# Patient Record
Sex: MALE | Race: WHITE | Employment: FULL TIME | URBAN - NONMETROPOLITAN AREA
[De-identification: names, ages, dates, MRNs, and addresses within clinical notes are randomized per-mention and may not be internally consistent; named-entity substitution may affect disease eponyms.]

---

## 2018-09-13 ENCOUNTER — APPOINTMENT (OUTPATIENT)
Dept: CT IMAGING | Age: 68
DRG: 690 | End: 2018-09-13
Payer: MEDICARE

## 2018-09-13 ENCOUNTER — APPOINTMENT (OUTPATIENT)
Dept: GENERAL RADIOLOGY | Age: 68
DRG: 690 | End: 2018-09-13
Payer: MEDICARE

## 2018-09-13 ENCOUNTER — HOSPITAL ENCOUNTER (INPATIENT)
Age: 68
LOS: 1 days | Discharge: HOME | DRG: 690 | End: 2018-09-15
Attending: FAMILY MEDICINE | Admitting: INTERNAL MEDICINE
Payer: MEDICARE

## 2018-09-13 DIAGNOSIS — R65.10 SIRS (SYSTEMIC INFLAMMATORY RESPONSE SYNDROME) (HCC): Primary | ICD-10-CM

## 2018-09-13 DIAGNOSIS — D72.829 LEUKOCYTOSIS, UNSPECIFIED TYPE: ICD-10-CM

## 2018-09-13 DIAGNOSIS — R06.00 DYSPNEA, UNSPECIFIED TYPE: ICD-10-CM

## 2018-09-13 DIAGNOSIS — N39.0 URINARY TRACT INFECTION WITHOUT HEMATURIA, SITE UNSPECIFIED: ICD-10-CM

## 2018-09-13 LAB
AMPHETAMINE+METHAMPHETAMINE URINE SCREEN: NEGATIVE
ANION GAP SERPL CALCULATED.3IONS-SCNC: 11 MEQ/L (ref 8–16)
BACTERIA: ABNORMAL /HPF
BARBITURATE QUANTITATIVE URINE: NEGATIVE
BENZODIAZEPINE QUANTITATIVE URINE: NEGATIVE
BILIRUBIN URINE: ABNORMAL
BLOOD, URINE: ABNORMAL
BUN BLDV-MCNC: 18 MG/DL (ref 7–22)
CALCIUM SERPL-MCNC: 8.8 MG/DL (ref 8.5–10.5)
CANNABINOID QUANTITATIVE URINE: POSITIVE
CASTS 2: ABNORMAL /LPF
CASTS UA: ABNORMAL /LPF
CHARACTER, URINE: ABNORMAL
CHLORIDE BLD-SCNC: 101 MEQ/L (ref 98–111)
CO2: 26 MEQ/L (ref 23–33)
COCAINE METABOLITE QUANTITATIVE URINE: NEGATIVE
COLOR: ABNORMAL
CREAT SERPL-MCNC: 1.4 MG/DL (ref 0.4–1.2)
CRYSTALS, UA: ABNORMAL
D-DIMER QUANTITATIVE: 246 NG/ML FEU (ref 0–500)
EPITHELIAL CELLS, UA: ABNORMAL /HPF
GFR SERPL CREATININE-BSD FRML MDRD: 51 ML/MIN/1.73M2
GLUCOSE BLD-MCNC: 127 MG/DL (ref 70–108)
GLUCOSE URINE: NEGATIVE MG/DL
ICTOTEST: NEGATIVE
KETONES, URINE: ABNORMAL
LEUKOCYTE ESTERASE, URINE: ABNORMAL
MISCELLANEOUS 2: ABNORMAL
NITRITE, URINE: POSITIVE
OPIATES, URINE: POSITIVE
OSMOLALITY CALCULATION: 279.2 MOSMOL/KG (ref 275–300)
OXYCODONE: NEGATIVE
PH UA: 5.5
PHENCYCLIDINE QUANTITATIVE URINE: NEGATIVE
POTASSIUM SERPL-SCNC: 3.7 MEQ/L (ref 3.5–5.2)
PRO-BNP: 279.1 PG/ML (ref 0–900)
PROCALCITONIN: 0.14 NG/ML (ref 0.01–0.09)
PROTEIN UA: 100
RBC URINE: ABNORMAL /HPF
RENAL EPITHELIAL, UA: ABNORMAL
SCAN OF BLOOD SMEAR: NORMAL
SODIUM BLD-SCNC: 138 MEQ/L (ref 135–145)
SPECIFIC GRAVITY, URINE: > 1.03 (ref 1–1.03)
TROPONIN T: < 0.01 NG/ML
UROBILINOGEN, URINE: 1 EU/DL
WBC UA: > 200 /HPF
YEAST: ABNORMAL

## 2018-09-13 PROCEDURE — 2580000003 HC RX 258: Performed by: FAMILY MEDICINE

## 2018-09-13 PROCEDURE — 87077 CULTURE AEROBIC IDENTIFY: CPT

## 2018-09-13 PROCEDURE — 84145 PROCALCITONIN (PCT): CPT

## 2018-09-13 PROCEDURE — 51798 US URINE CAPACITY MEASURE: CPT

## 2018-09-13 PROCEDURE — 80307 DRUG TEST PRSMV CHEM ANLYZR: CPT

## 2018-09-13 PROCEDURE — 87086 URINE CULTURE/COLONY COUNT: CPT

## 2018-09-13 PROCEDURE — 87040 BLOOD CULTURE FOR BACTERIA: CPT

## 2018-09-13 PROCEDURE — 6360000002 HC RX W HCPCS: Performed by: FAMILY MEDICINE

## 2018-09-13 PROCEDURE — 83880 ASSAY OF NATRIURETIC PEPTIDE: CPT

## 2018-09-13 PROCEDURE — 87184 SC STD DISK METHOD PER PLATE: CPT

## 2018-09-13 PROCEDURE — 87186 SC STD MICRODIL/AGAR DIL: CPT

## 2018-09-13 PROCEDURE — 96365 THER/PROPH/DIAG IV INF INIT: CPT

## 2018-09-13 PROCEDURE — 6360000004 HC RX CONTRAST MEDICATION: Performed by: FAMILY MEDICINE

## 2018-09-13 PROCEDURE — 99285 EMERGENCY DEPT VISIT HI MDM: CPT

## 2018-09-13 PROCEDURE — 36415 COLL VENOUS BLD VENIPUNCTURE: CPT

## 2018-09-13 PROCEDURE — 85025 COMPLETE CBC W/AUTO DIFF WBC: CPT

## 2018-09-13 PROCEDURE — 84484 ASSAY OF TROPONIN QUANT: CPT

## 2018-09-13 PROCEDURE — 71275 CT ANGIOGRAPHY CHEST: CPT

## 2018-09-13 PROCEDURE — 81001 URINALYSIS AUTO W/SCOPE: CPT

## 2018-09-13 PROCEDURE — 85379 FIBRIN DEGRADATION QUANT: CPT

## 2018-09-13 PROCEDURE — 80048 BASIC METABOLIC PNL TOTAL CA: CPT

## 2018-09-13 PROCEDURE — 93005 ELECTROCARDIOGRAM TRACING: CPT | Performed by: FAMILY MEDICINE

## 2018-09-13 PROCEDURE — 71046 X-RAY EXAM CHEST 2 VIEWS: CPT

## 2018-09-13 RX ORDER — LISINOPRIL 20 MG/1
20 TABLET ORAL DAILY
COMMUNITY

## 2018-09-13 RX ORDER — CIPROFLOXACIN 2 MG/ML
400 INJECTION, SOLUTION INTRAVENOUS ONCE
Status: COMPLETED | OUTPATIENT
Start: 2018-09-13 | End: 2018-09-14

## 2018-09-13 RX ORDER — ASPIRIN 325 MG
325 TABLET ORAL DAILY
COMMUNITY

## 2018-09-13 RX ORDER — MELOXICAM 7.5 MG/1
7.5 TABLET ORAL DAILY
Status: ON HOLD | COMMUNITY
End: 2018-09-15 | Stop reason: HOSPADM

## 2018-09-13 RX ORDER — SODIUM CHLORIDE 9 MG/ML
INJECTION, SOLUTION INTRAVENOUS CONTINUOUS
Status: DISCONTINUED | OUTPATIENT
Start: 2018-09-14 | End: 2018-09-15

## 2018-09-13 RX ORDER — FENOFIBRATE 160 MG/1
160 TABLET ORAL DAILY
COMMUNITY

## 2018-09-13 RX ORDER — ROSUVASTATIN CALCIUM 20 MG/1
20 TABLET, COATED ORAL DAILY
COMMUNITY

## 2018-09-13 RX ORDER — HYDROCHLOROTHIAZIDE 25 MG/1
25 TABLET ORAL DAILY
COMMUNITY

## 2018-09-13 RX ORDER — OMEPRAZOLE 20 MG/1
40 CAPSULE, DELAYED RELEASE ORAL DAILY
COMMUNITY

## 2018-09-13 RX ORDER — CLOPIDOGREL BISULFATE 75 MG/1
75 TABLET ORAL DAILY
COMMUNITY

## 2018-09-13 RX ADMIN — CIPROFLOXACIN 400 MG: 2 INJECTION, SOLUTION INTRAVENOUS at 23:51

## 2018-09-13 RX ADMIN — SODIUM CHLORIDE: 9 INJECTION, SOLUTION INTRAVENOUS at 23:51

## 2018-09-13 RX ADMIN — IOPAMIDOL 80 ML: 755 INJECTION, SOLUTION INTRAVENOUS at 22:36

## 2018-09-13 ASSESSMENT — ENCOUNTER SYMPTOMS
RHINORRHEA: 0
DIARRHEA: 0
COUGH: 0
EYE REDNESS: 0
SORE THROAT: 0
WHEEZING: 0
BACK PAIN: 0
EYE DISCHARGE: 0
VOMITING: 0
SHORTNESS OF BREATH: 1
ABDOMINAL PAIN: 0
NAUSEA: 0

## 2018-09-14 ENCOUNTER — APPOINTMENT (OUTPATIENT)
Dept: ULTRASOUND IMAGING | Age: 68
DRG: 690 | End: 2018-09-14
Payer: MEDICARE

## 2018-09-14 PROBLEM — R33.8 ACUTE URINARY RETENTION: Status: ACTIVE | Noted: 2018-09-14

## 2018-09-14 PROBLEM — K52.9 GASTROENTERITIS: Status: ACTIVE | Noted: 2018-09-14

## 2018-09-14 PROBLEM — R10.9 RIGHT FLANK PAIN: Status: ACTIVE | Noted: 2018-09-14

## 2018-09-14 PROBLEM — N17.9 ACUTE KIDNEY INJURY (HCC): Status: ACTIVE | Noted: 2018-09-14

## 2018-09-14 PROBLEM — R31.9 URINARY TRACT INFECTION WITH HEMATURIA: Status: ACTIVE | Noted: 2018-09-14

## 2018-09-14 PROBLEM — R91.1 NODULE OF RIGHT LUNG: Status: ACTIVE | Noted: 2018-09-14

## 2018-09-14 PROBLEM — N39.0 URINARY TRACT INFECTION WITH HEMATURIA: Status: ACTIVE | Noted: 2018-09-14

## 2018-09-14 PROBLEM — N10 ACUTE PYELONEPHRITIS: Status: ACTIVE | Noted: 2018-09-14

## 2018-09-14 PROBLEM — K52.9 GASTROENTERITIS: Status: RESOLVED | Noted: 2018-09-14 | Resolved: 2018-09-14

## 2018-09-14 PROBLEM — A41.50 SEPSIS DUE TO GRAM NEGATIVE BACTERIA (HCC): Status: ACTIVE | Noted: 2018-09-14

## 2018-09-14 LAB
ANION GAP SERPL CALCULATED.3IONS-SCNC: 13 MEQ/L (ref 8–16)
BACTERIA: ABNORMAL
BASOPHILIA: ABNORMAL
BASOPHILS # BLD: 0.3 %
BASOPHILS # BLD: 0.3 %
BASOPHILS ABSOLUTE: 0.1 THOU/MM3 (ref 0–0.1)
BASOPHILS ABSOLUTE: 0.1 THOU/MM3 (ref 0–0.1)
BILIRUBIN URINE: NEGATIVE
BLOOD, URINE: ABNORMAL
BUN BLDV-MCNC: 19 MG/DL (ref 7–22)
CALCIUM SERPL-MCNC: 8.4 MG/DL (ref 8.5–10.5)
CASTS: ABNORMAL /LPF
CASTS: ABNORMAL /LPF
CHARACTER, URINE: ABNORMAL
CHLORIDE BLD-SCNC: 105 MEQ/L (ref 98–111)
CLOSTRIDIUM DIFFICILE, PCR: NEGATIVE
CO2: 23 MEQ/L (ref 23–33)
COLOR: YELLOW
CREAT SERPL-MCNC: 1.3 MG/DL (ref 0.4–1.2)
CRYSTALS: ABNORMAL
DIFFERENTIAL TYPE: ABNORMAL
EKG ATRIAL RATE: 111 BPM
EKG ATRIAL RATE: 89 BPM
EKG P AXIS: 46 DEGREES
EKG P AXIS: 54 DEGREES
EKG P-R INTERVAL: 158 MS
EKG P-R INTERVAL: 172 MS
EKG Q-T INTERVAL: 306 MS
EKG Q-T INTERVAL: 334 MS
EKG QRS DURATION: 96 MS
EKG QRS DURATION: 98 MS
EKG QTC CALCULATION (BAZETT): 406 MS
EKG QTC CALCULATION (BAZETT): 416 MS
EKG R AXIS: -46 DEGREES
EKG R AXIS: -57 DEGREES
EKG T AXIS: 90 DEGREES
EKG T AXIS: 99 DEGREES
EKG VENTRICULAR RATE: 111 BPM
EKG VENTRICULAR RATE: 89 BPM
EOSINOPHIL # BLD: 0.2 %
EOSINOPHIL # BLD: 0.5 %
EOSINOPHILS ABSOLUTE: 0 THOU/MM3 (ref 0–0.4)
EOSINOPHILS ABSOLUTE: 0.1 THOU/MM3 (ref 0–0.4)
EPITHELIAL CELLS, UA: ABNORMAL /HPF
ERYTHROCYTE [DISTWIDTH] IN BLOOD BY AUTOMATED COUNT: 17 % (ref 11.5–14.5)
ERYTHROCYTE [DISTWIDTH] IN BLOOD BY AUTOMATED COUNT: 17 % (ref 11.5–14.5)
ERYTHROCYTE [DISTWIDTH] IN BLOOD BY AUTOMATED COUNT: 49.1 FL (ref 35–45)
ERYTHROCYTE [DISTWIDTH] IN BLOOD BY AUTOMATED COUNT: 49.8 FL (ref 35–45)
GFR SERPL CREATININE-BSD FRML MDRD: 55 ML/MIN/1.73M2
GLUCOSE BLD-MCNC: 107 MG/DL (ref 70–108)
GLUCOSE, URINE: NEGATIVE MG/DL
HCT VFR BLD CALC: 45.8 % (ref 42–52)
HCT VFR BLD CALC: 46.5 % (ref 42–52)
HEMOGLOBIN: 13.8 GM/DL (ref 14–18)
HEMOGLOBIN: 14.3 GM/DL (ref 14–18)
IMMATURE GRANS (ABS): 0.1 THOU/MM3 (ref 0–0.07)
IMMATURE GRANS (ABS): 0.11 THOU/MM3 (ref 0–0.07)
IMMATURE GRANULOCYTES: 0.5 %
IMMATURE GRANULOCYTES: 0.6 %
KETONES, URINE: NEGATIVE
LACTIC ACID: 1.2 MMOL/L (ref 0.5–2.2)
LEUKOCYTE EST, POC: ABNORMAL
LYMPHOCYTES # BLD: 12.8 %
LYMPHOCYTES # BLD: 17.8 %
LYMPHOCYTES ABSOLUTE: 2.4 THOU/MM3 (ref 1–4.8)
LYMPHOCYTES ABSOLUTE: 3.5 THOU/MM3 (ref 1–4.8)
MAGNESIUM: 2.1 MG/DL (ref 1.6–2.4)
MCH RBC QN AUTO: 25.3 PG (ref 26–33)
MCH RBC QN AUTO: 25.5 PG (ref 26–33)
MCHC RBC AUTO-ENTMCNC: 30.1 GM/DL (ref 32.2–35.5)
MCHC RBC AUTO-ENTMCNC: 30.8 GM/DL (ref 32.2–35.5)
MCV RBC AUTO: 83 FL (ref 80–94)
MCV RBC AUTO: 84 FL (ref 80–94)
MISCELLANEOUS LAB TEST RESULT: ABNORMAL
MONOCYTES # BLD: 13 %
MONOCYTES # BLD: 14.8 %
MONOCYTES ABSOLUTE: 2.5 THOU/MM3 (ref 0.4–1.3)
MONOCYTES ABSOLUTE: 2.7 THOU/MM3 (ref 0.4–1.3)
NITRITE, URINE: NEGATIVE
NUCLEATED RED BLOOD CELLS: 0 /100 WBC
NUCLEATED RED BLOOD CELLS: 0 /100 WBC
PATHOLOGIST REVIEW: ABNORMAL
PH UA: 5.5
PHOSPHORUS: 2.3 MG/DL (ref 2.4–4.7)
PLATELET # BLD: 201 THOU/MM3 (ref 130–400)
PLATELET # BLD: 234 THOU/MM3 (ref 130–400)
PLATELET ESTIMATE: ADEQUATE
PLATELET ESTIMATE: ADEQUATE
PMV BLD AUTO: 10.6 FL (ref 9.4–12.4)
PMV BLD AUTO: 11.8 FL (ref 9.4–12.4)
POTASSIUM SERPL-SCNC: 3.9 MEQ/L (ref 3.5–5.2)
PROTEIN UA: ABNORMAL MG/DL
RBC # BLD: 5.45 MILL/MM3 (ref 4.7–6.1)
RBC # BLD: 5.6 MILL/MM3 (ref 4.7–6.1)
RBC URINE: ABNORMAL /HPF
RENAL EPITHELIAL, UA: ABNORMAL
SCAN OF BLOOD SMEAR: NORMAL
SEG NEUTROPHILS: 67.8 %
SEG NEUTROPHILS: 71.4 %
SEGMENTED NEUTROPHILS ABSOLUTE COUNT: 13.2 THOU/MM3 (ref 1.8–7.7)
SEGMENTED NEUTROPHILS ABSOLUTE COUNT: 13.2 THOU/MM3 (ref 1.8–7.7)
SODIUM BLD-SCNC: 141 MEQ/L (ref 135–145)
SPECIFIC GRAVITY UA: > 1.03 (ref 1–1.03)
TOTAL CK: 361 U/L (ref 55–170)
UROBILINOGEN, URINE: 0.2 EU/DL
WBC # BLD: 18.5 THOU/MM3 (ref 4.8–10.8)
WBC # BLD: 19.4 THOU/MM3 (ref 4.8–10.8)
WBC UA: ABNORMAL /HPF
YEAST: ABNORMAL

## 2018-09-14 PROCEDURE — 94640 AIRWAY INHALATION TREATMENT: CPT

## 2018-09-14 PROCEDURE — 87081 CULTURE SCREEN ONLY: CPT

## 2018-09-14 PROCEDURE — 93005 ELECTROCARDIOGRAM TRACING: CPT | Performed by: INTERNAL MEDICINE

## 2018-09-14 PROCEDURE — 96366 THER/PROPH/DIAG IV INF ADDON: CPT

## 2018-09-14 PROCEDURE — G0378 HOSPITAL OBSERVATION PER HR: HCPCS

## 2018-09-14 PROCEDURE — 85025 COMPLETE CBC W/AUTO DIFF WBC: CPT

## 2018-09-14 PROCEDURE — 81001 URINALYSIS AUTO W/SCOPE: CPT

## 2018-09-14 PROCEDURE — 2580000003 HC RX 258: Performed by: INTERNAL MEDICINE

## 2018-09-14 PROCEDURE — 93010 ELECTROCARDIOGRAM REPORT: CPT | Performed by: INTERNAL MEDICINE

## 2018-09-14 PROCEDURE — 6370000000 HC RX 637 (ALT 250 FOR IP): Performed by: INTERNAL MEDICINE

## 2018-09-14 PROCEDURE — 87493 C DIFF AMPLIFIED PROBE: CPT

## 2018-09-14 PROCEDURE — 36415 COLL VENOUS BLD VENIPUNCTURE: CPT

## 2018-09-14 PROCEDURE — 2580000003 HC RX 258: Performed by: FAMILY MEDICINE

## 2018-09-14 PROCEDURE — 1200000003 HC TELEMETRY R&B

## 2018-09-14 PROCEDURE — 99223 1ST HOSP IP/OBS HIGH 75: CPT | Performed by: INTERNAL MEDICINE

## 2018-09-14 PROCEDURE — 80048 BASIC METABOLIC PNL TOTAL CA: CPT

## 2018-09-14 PROCEDURE — 6360000002 HC RX W HCPCS: Performed by: INTERNAL MEDICINE

## 2018-09-14 PROCEDURE — 83605 ASSAY OF LACTIC ACID: CPT

## 2018-09-14 PROCEDURE — 76770 US EXAM ABDO BACK WALL COMP: CPT

## 2018-09-14 PROCEDURE — 51798 US URINE CAPACITY MEASURE: CPT

## 2018-09-14 PROCEDURE — 84100 ASSAY OF PHOSPHORUS: CPT

## 2018-09-14 PROCEDURE — 82550 ASSAY OF CK (CPK): CPT

## 2018-09-14 PROCEDURE — 83735 ASSAY OF MAGNESIUM: CPT

## 2018-09-14 RX ORDER — TESTOSTERONE CYPIONATE 200 MG/ML
200 INJECTION INTRAMUSCULAR
COMMUNITY

## 2018-09-14 RX ORDER — PANTOPRAZOLE SODIUM 40 MG/1
40 TABLET, DELAYED RELEASE ORAL
Status: DISCONTINUED | OUTPATIENT
Start: 2018-09-14 | End: 2018-09-15 | Stop reason: HOSPADM

## 2018-09-14 RX ORDER — ASPIRIN 325 MG
325 TABLET ORAL DAILY
Status: DISCONTINUED | OUTPATIENT
Start: 2018-09-14 | End: 2018-09-15 | Stop reason: HOSPADM

## 2018-09-14 RX ORDER — FENOFIBRATE 160 MG/1
160 TABLET ORAL DAILY
Status: DISCONTINUED | OUTPATIENT
Start: 2018-09-14 | End: 2018-09-15 | Stop reason: HOSPADM

## 2018-09-14 RX ORDER — POTASSIUM CHLORIDE 20 MEQ/1
40 TABLET, EXTENDED RELEASE ORAL PRN
Status: DISCONTINUED | OUTPATIENT
Start: 2018-09-14 | End: 2018-09-15 | Stop reason: HOSPADM

## 2018-09-14 RX ORDER — IPRATROPIUM BROMIDE AND ALBUTEROL SULFATE 2.5; .5 MG/3ML; MG/3ML
1 SOLUTION RESPIRATORY (INHALATION) EVERY 4 HOURS PRN
Status: DISCONTINUED | OUTPATIENT
Start: 2018-09-14 | End: 2018-09-15 | Stop reason: HOSPADM

## 2018-09-14 RX ORDER — HYDRALAZINE HYDROCHLORIDE 25 MG/1
25 TABLET, FILM COATED ORAL EVERY 8 HOURS SCHEDULED
Status: DISCONTINUED | OUTPATIENT
Start: 2018-09-14 | End: 2018-09-15

## 2018-09-14 RX ORDER — CIPROFLOXACIN 2 MG/ML
400 INJECTION, SOLUTION INTRAVENOUS EVERY 12 HOURS
Status: DISCONTINUED | OUTPATIENT
Start: 2018-09-14 | End: 2018-09-15

## 2018-09-14 RX ORDER — TRAMADOL HYDROCHLORIDE 50 MG/1
50 TABLET ORAL EVERY 6 HOURS PRN
Status: DISCONTINUED | OUTPATIENT
Start: 2018-09-14 | End: 2018-09-15 | Stop reason: HOSPADM

## 2018-09-14 RX ORDER — POTASSIUM CHLORIDE 20 MEQ/1
40 TABLET, EXTENDED RELEASE ORAL PRN
Status: DISCONTINUED | OUTPATIENT
Start: 2018-09-14 | End: 2018-09-14 | Stop reason: SDUPTHER

## 2018-09-14 RX ORDER — ONDANSETRON 2 MG/ML
4 INJECTION INTRAMUSCULAR; INTRAVENOUS EVERY 6 HOURS PRN
Status: DISCONTINUED | OUTPATIENT
Start: 2018-09-14 | End: 2018-09-15 | Stop reason: HOSPADM

## 2018-09-14 RX ORDER — POTASSIUM CHLORIDE 7.45 MG/ML
10 INJECTION INTRAVENOUS PRN
Status: DISCONTINUED | OUTPATIENT
Start: 2018-09-14 | End: 2018-09-15 | Stop reason: HOSPADM

## 2018-09-14 RX ORDER — POTASSIUM CHLORIDE 20MEQ/15ML
40 LIQUID (ML) ORAL PRN
Status: DISCONTINUED | OUTPATIENT
Start: 2018-09-14 | End: 2018-09-14 | Stop reason: SDUPTHER

## 2018-09-14 RX ORDER — ATORVASTATIN CALCIUM 20 MG/1
20 TABLET, FILM COATED ORAL NIGHTLY
Status: DISCONTINUED | OUTPATIENT
Start: 2018-09-14 | End: 2018-09-15 | Stop reason: HOSPADM

## 2018-09-14 RX ORDER — CLOPIDOGREL BISULFATE 75 MG/1
75 TABLET ORAL DAILY
Status: DISCONTINUED | OUTPATIENT
Start: 2018-09-14 | End: 2018-09-15 | Stop reason: HOSPADM

## 2018-09-14 RX ORDER — TAMSULOSIN HYDROCHLORIDE 0.4 MG/1
0.4 CAPSULE ORAL DAILY
Status: DISCONTINUED | OUTPATIENT
Start: 2018-09-14 | End: 2018-09-15 | Stop reason: HOSPADM

## 2018-09-14 RX ORDER — ACETAMINOPHEN 325 MG/1
650 TABLET ORAL EVERY 4 HOURS PRN
Status: DISCONTINUED | OUTPATIENT
Start: 2018-09-14 | End: 2018-09-15 | Stop reason: HOSPADM

## 2018-09-14 RX ORDER — POTASSIUM CHLORIDE 20MEQ/15ML
40 LIQUID (ML) ORAL PRN
Status: DISCONTINUED | OUTPATIENT
Start: 2018-09-14 | End: 2018-09-15 | Stop reason: HOSPADM

## 2018-09-14 RX ORDER — POTASSIUM CHLORIDE 7.45 MG/ML
10 INJECTION INTRAVENOUS PRN
Status: DISCONTINUED | OUTPATIENT
Start: 2018-09-14 | End: 2018-09-14 | Stop reason: SDUPTHER

## 2018-09-14 RX ADMIN — METOPROLOL TARTRATE 25 MG: 25 TABLET ORAL at 21:02

## 2018-09-14 RX ADMIN — ATORVASTATIN CALCIUM 20 MG: 20 TABLET, FILM COATED ORAL at 21:02

## 2018-09-14 RX ADMIN — METOPROLOL TARTRATE 25 MG: 25 TABLET ORAL at 09:26

## 2018-09-14 RX ADMIN — Medication 2 PUFF: at 21:13

## 2018-09-14 RX ADMIN — HYDRALAZINE HYDROCHLORIDE 25 MG: 25 TABLET ORAL at 23:00

## 2018-09-14 RX ADMIN — PANTOPRAZOLE SODIUM 40 MG: 40 TABLET, DELAYED RELEASE ORAL at 05:40

## 2018-09-14 RX ADMIN — SODIUM CHLORIDE: 9 INJECTION, SOLUTION INTRAVENOUS at 13:01

## 2018-09-14 RX ADMIN — ASPIRIN 325 MG: 325 TABLET, COATED ORAL at 16:02

## 2018-09-14 RX ADMIN — FENOFIBRATE 160 MG: 160 TABLET ORAL at 09:26

## 2018-09-14 RX ADMIN — CLOPIDOGREL BISULFATE 75 MG: 75 TABLET ORAL at 16:02

## 2018-09-14 RX ADMIN — CIPROFLOXACIN 400 MG: 2 INJECTION, SOLUTION INTRAVENOUS at 23:30

## 2018-09-14 RX ADMIN — HYDRALAZINE HYDROCHLORIDE 25 MG: 25 TABLET ORAL at 05:37

## 2018-09-14 RX ADMIN — SODIUM CHLORIDE: 9 INJECTION, SOLUTION INTRAVENOUS at 05:44

## 2018-09-14 RX ADMIN — RIVAROXABAN 20 MG: 20 TABLET, FILM COATED ORAL at 16:02

## 2018-09-14 RX ADMIN — HYDRALAZINE HYDROCHLORIDE 25 MG: 25 TABLET ORAL at 15:03

## 2018-09-14 RX ADMIN — CIPROFLOXACIN 400 MG: 2 INJECTION, SOLUTION INTRAVENOUS at 13:01

## 2018-09-14 ASSESSMENT — PAIN SCALES - GENERAL
PAINLEVEL_OUTOF10: 0

## 2018-09-14 NOTE — PLAN OF CARE
Problem: Discharge Planning:  Goal: Discharged to appropriate level of care  Discharged to appropriate level of care   Outcome: Ongoing  Home with family     Problem: Gas Exchange - Impaired:  Goal: Levels of oxygenation will improve  Levels of oxygenation will improve   Outcome: Ongoing  On room air, lungs clear and diminished    Problem: Infection, Septic Shock:  Goal: Will show no infection signs and symptoms  Will show no infection signs and symptoms   Outcome: Ongoing  WBC 19, on Cipro, afebrile     Problem: Tissue Perfusion, Altered:  Goal: Circulatory function within specified parameters  Circulatory function within specified parameters   Outcome: Ongoing  Monitoring VS, IVF for low urine output    Problem: Venous Thromboembolism:  Goal: Will show no signs or symptoms of venous thromboembolism  Will show no signs or symptoms of venous thromboembolism   Outcome: Ongoing  On xarelto for hx Afib    Problem: Skin Integrity:  Goal: Skin integrity will stabilize  Skin integrity will stabilize   Outcome: Ongoing  Blister to right shin. No new signs or symptoms of skin breakdown noted this shift, encouraging patient to turn and reposition self in bed q2h      Problem: GI  Goal: No bowel complications  Outcome: Ongoing  Had one loose BM, Cdiff negative     Problem: Musculor/Skeletal Functional Status  Goal: Absence of falls  Outcome: Ongoing  No falls noted this shift. Continue falling star program. Bed alarm on, bed in low position. Call light and personal belongings in reach. Patient uses call light appropriately. Comments: Care plan reviewed with patient. Patient verbalize understanding of the plan of care and contribute to goal setting.

## 2018-09-14 NOTE — PROGRESS NOTES
Urology consult called to Dr Jan Guevara for urinary retention. Order for bladder scan then to call with results.

## 2018-09-14 NOTE — ED NOTES
Pt resting comfortably on cot. Respires easy and unlabored. Updated on admission status. Will monitor.       Anisha Hamlin RN  09/14/18 6045

## 2018-09-14 NOTE — ED PROVIDER NOTES
CT, Ultrasound and MRI are read by the radiologist.    CTA [address]   Final Result   1. Negative exam for obvious pulmonary embolus changes. 2. Cardiac enlargement with minimal venous stasis and/or mild congestion see above discussion. 3. Right middle lobe pulmonary nodule, six-month one-year follow-up can be considered for further assessment. **This report has been created using voice recognition software. It may contain minor errors which are inherent in voice recognition technology. **      Final report electronically signed by Dr. Lizzy Diaz on 9/13/2018 10:57 PM      XR CHEST STANDARD (2 VW)   Final Result   1. Postoperative sternotomy changes and cardiac enlargement. 2. No active pathology present. **This report has been created using voice recognition software. It may contain minor errors which are inherent in voice recognition technology. **      Final report electronically signed by Dr. Lizzy Diaz on 9/13/2018 9:12 PM      US RENAL COMPLETE    (Results Pending)       LABS:   Labs Reviewed   CBC WITH AUTO DIFFERENTIAL - Abnormal; Notable for the following:        Result Value    WBC 18.5 (*)     MCH 25.5 (*)     MCHC 30.8 (*)     RDW-CV 17.0 (*)     RDW-SD 49.1 (*)     All other components within normal limits   BASIC METABOLIC PANEL - Abnormal; Notable for the following:     Glucose 127 (*)     CREATININE 1.4 (*)     All other components within normal limits   URINE WITH REFLEXED MICRO - Abnormal; Notable for the following:     Bilirubin Urine SMALL (*)     Ketones, Urine TRACE (*)     Specific Gravity, Urine > 1.030 (*)     Blood, Urine LARGE (*)     Protein,  (*)     Nitrite, Urine POSITIVE (*)     Leukocyte Esterase, Urine MODERATE (*)     Color, UA DARK YELLOW (*)     Character, Urine TURBID (*)     All other components within normal limits   GLOMERULAR FILTRATION RATE, ESTIMATED - Abnormal; Notable for the following:     Est, Glom Filt Rate 51 (*) portions of this note were completed with a voice recognition program.  Efforts were made to edit the dictations but occasionally words are mis-transcribed.)    Scribe:  Claude Nims Vega9/13/18 8:40 PM Scribing for and in the presence of Prashant Horton MD     Signed by: Nesha Padgett, 09/14/18 1:58 AM    Provider:  I personally performed the services described in the documentation, reviewed and edited the documentation which was dictated to the scribe in my presence, and it accurately records my words and actions.     Prashant Horton MD  09/14/18 1:58 AM                              Prashant Horton MD  09/14/18 5705

## 2018-09-14 NOTE — FLOWSHEET NOTE
09/14/18 0316   Provider Notification   Reason for Communication Evaluate   Provider Name [address]   Provider Notification Physician   Method of Communication Secure Message   Response Waiting for response   Notification Time 2712     Patient stated he had watery, loose stools. Per protocol - Cdiff isolation and stool collection order placed. Patient had very soft stool (not watery).  [address] said to keep Cdiff stool collection and isolation order. Notified that patient had UA in emergency department. Order for UA today.  [address] verified that he still wanted second UA completed this a.m.

## 2018-09-14 NOTE — CARE COORDINATION
9/14/18, 7:35 AM      Lilia Ly       Admitted from: ED 9/13/2018/ 2016 Hospital day: 0   Location: [address] [place] [address]- Reason for admit: Sepsis due to Gram negative bacteria ([facility] [A41.50] Status: IP  Admit order signed?: yes  PMH:  has a past medical history of Arthritis; Bell's palsy; CAD (coronary artery disease); COPD (chronic obstructive pulmonary disease) ([facility] [facility] Hyperlipidemia; and Hypertension. Procedure: None  Pertinent abnormal Imaging:CTA Chest W WO Contrast    Impression:       1. Negative exam for obvious pulmonary embolus changes. 2. Cardiac enlargement with minimal venous stasis and/or mild congestion see above discussion. 3. Right middle lobe pulmonary nodule, six-month one-year follow-up can be considered for further assessment. CXR   Impression:        1. Postoperative sternotomy changes and cardiac enlargement. 2. No active pathology present. Medications:  Scheduled Meds:   pantoprazole  40 mg Oral QAM AC    atorvastatin  20 mg Oral Nightly    fenofibrate  160 mg Oral Daily    metoprolol tartrate  25 mg Oral BID    ciprofloxacin  400 mg Intravenous Q12H    tamsulosin  0.4 mg Oral Daily    hydrALAZINE  25 mg Oral 3 times per day     Continuous Infusions:   sodium chloride 100 mL/hr at 09/14/18 0544      Pertinent Info/Orders/Treatment Plan:  WBC 19.4, Procalcitonin 0.14, Urine (+), IV fluids, IV antibiotics, telemetry, C-Diff isolation  Diet: DIET GENERAL;   DVT Prophylaxis: SCD's ordered and on  Smoking status:  reports that he has never smoked. He has never used smokeless tobacco.   Influenza Vaccination Screening Completed: yes  Pneumonia Vaccination Screening Completed: yes  PCP: No primary care provider on file.   Readmission: no  Readmission Risk Score: 6%    Discharge Planning  Current Residence:  Private Residence  Living Arrangements:  Spouse/Significant Other   Support Systems:  Spouse/Significant Other, Family Members  Current Services PTA:     Potential Assistance

## 2018-09-14 NOTE — H&P
History & Physical        Patient:  Jessica Collins  YOB: 1950    MRN: [id number]     Acct: [de-identified]    PCP: No primary care provider on file. Date of Admission: 9/13/2018    Chief Complaint:  Urinary retention with fever    History Of Present Illness:   80 y.o. male who presented to [address] [place] [address] with dyspnea associated with urinary retention and fever. Denies cough, phlegm. Reports being compliant with water pills. He has known prostatism and describes inability to fully empty his bladder. No chest pain, diarrhea. No family hx of  malignancy or cigarette use in the past.    Past Medical History:          Diagnosis Date    CAD (coronary artery disease)     COPD (chronic obstructive pulmonary disease) ([facility] [address]     Hypertension        Past Surgical History:          Procedure Laterality Date    CARDIAC SURGERY         Medications Prior to Admission:      Prior to Admission medications    Medication Sig Start Date End Date Taking?  Authorizing Provider   rivaroxaban (XARELTO) 10 MG TABS tablet Take 10 mg by mouth   Yes Historical Provider, MD   omeprazole (PRILOSEC) 20 MG delayed release capsule Take 40 mg by mouth daily   Yes Historical Provider, MD   rosuvastatin (CRESTOR) 20 MG tablet Take 20 mg by mouth daily   Yes Historical Provider, MD   clopidogrel (PLAVIX) 75 MG tablet Take 75 mg by mouth daily   Yes Historical Provider, MD   hydrochlorothiazide (HYDRODIURIL) 25 MG tablet Take 25 mg by mouth daily   Yes Historical Provider, MD   fenofibrate 160 MG tablet Take 160 mg by mouth daily   Yes Historical Provider, MD   lisinopril (PRINIVIL;ZESTRIL) 20 MG tablet Take 20 mg by mouth daily   Yes Historical Provider, MD   metoprolol tartrate (LOPRESSOR) 25 MG tablet Take 25 mg by mouth 2 times daily   Yes Historical Provider, MD   meloxicam (MOBIC) 7.5 MG tablet Take 7.5 mg by mouth daily   Yes Historical Provider, MD   Multiple Vitamins-Minerals (DAILY VITAMINS/MINERALS PO) Take by mouth   Yes Historical Provider, MD   aspirin 325 MG tablet Take 325 mg by mouth daily   Yes Historical Provider, MD   Testosterone Cypionate (DEPO-TESTOSTERONE IM) Inject into the muscle   Yes Historical Provider, MD       Allergies:  Biaxin [clarithromycin] and Penicillins    Social History:      TOBACCO:   reports that he has never smoked. He has never used smokeless tobacco.  ETOH:   reports that he does not drink alcohol. Family History:      Reviewed in detail and negative for DM, CAD, Cancer, CVA. Positive as follows:    History reviewed. No pertinent family history. Diet:       REVIEW OF SYSTEMS:   Pertinent positives as noted in the HPI. All other systems reviewed and negative. PHYSICAL EXAM:    BP (!) 102/47   Pulse 75   Temp 98.8 °F (37.1 °C) (Oral)   Resp 19   Ht 5' 6\" (1.676 m)   Wt (!) 325 lb (147.4 kg)   SpO2 98%   BMI 52.46 kg/m²     General appearance:  No apparent distress, appears stated age and cooperative. HEENT:  Normal cephalic, atraumatic without obvious deformity. Pupils equal, round, and reactive to light. Extra ocular muscles intact. Conjunctivae/corneas clear. Neck: Supple, with full range of motion. No jugular venous distention. Trachea midline. Respiratory:  Normal respiratory effort. Clear to auscultation, bilaterally without Rales/Wheezes/Rhonchi. Cardiovascular:  Regular rate and rhythm with normal S1/S2 without murmurs, rubs or gallops. Abdomen: Soft, non-tender, non-distended with normal bowel sounds. Musculoskeletal:  No clubbing, cyanosis or edema bilaterally. Full range of motion without deformity. Skin: Skin color, texture, turgor normal.  No rashes or lesions. Neurologic:  Neurovascularly intact without any focal sensory/motor deficits.  Cranial nerves: II-XII intact, grossly non-focal.  Psychiatric:  Alert and oriented, thought content appropriate, normal insight  Capillary Refill: Brisk,< 3 seconds   Peripheral Pulses: +2 palpable, equal SCDs                                 [] SQ Heparin                                 [] Encourage ambulation           [] Already on Anticoagulation    Code Status: full code    Disposition:    [x] Home       [] TCU       [] Rehab       [] Psych       [] SNF       [] [place]       [] Other-    ASSESSMENT AND PLAN:    1. Sepsis due to Complicated UTI with LINDA and hematuria   2. NS, Ciprofloxacin, Renal US in am  3. Follow Urine Cx, blood cx  4. Start Flomax  5. HTN: Stop diuretics and ACEI. Start Hydralazine for tonight. 6. CAD: Hold asp plavix and xarelto due to hematuria. Unknown reason for [facility]. 7. Will request PCP records in am  8. Right Pulm nodule: Follow in 6 months  9. Polysubstance use?: Marijuana and opiates in urine. 10. GI and DVT ppx      Thank you No primary care provider on file. for the opportunity to be involved in this patient's care.     Electronically signed by Dorothy Norwood MD on 9/14/2018 at 12:25 AM

## 2018-09-15 VITALS
SYSTOLIC BLOOD PRESSURE: 174 MMHG | WEIGHT: 280.3 LBS | HEIGHT: 66 IN | HEART RATE: 74 BPM | OXYGEN SATURATION: 95 % | DIASTOLIC BLOOD PRESSURE: 97 MMHG | RESPIRATION RATE: 20 BRPM | TEMPERATURE: 97.8 F | BODY MASS INDEX: 45.05 KG/M2

## 2018-09-15 PROBLEM — N17.9 ACUTE KIDNEY INJURY (HCC): Status: RESOLVED | Noted: 2018-09-14 | Resolved: 2018-09-15

## 2018-09-15 LAB
ALBUMIN SERPL-MCNC: 3.5 G/DL (ref 3.5–5.1)
ALP BLD-CCNC: 42 U/L (ref 38–126)
ALT SERPL-CCNC: 19 U/L (ref 11–66)
ANION GAP SERPL CALCULATED.3IONS-SCNC: 12 MEQ/L (ref 8–16)
AST SERPL-CCNC: 19 U/L (ref 5–40)
BASOPHILS # BLD: 0.4 %
BASOPHILS ABSOLUTE: 0 THOU/MM3 (ref 0–0.1)
BILIRUB SERPL-MCNC: 0.3 MG/DL (ref 0.3–1.2)
BUN BLDV-MCNC: 22 MG/DL (ref 7–22)
CALCIUM SERPL-MCNC: 8.4 MG/DL (ref 8.5–10.5)
CHLORIDE BLD-SCNC: 107 MEQ/L (ref 98–111)
CHOLESTEROL, TOTAL: 135 MG/DL (ref 100–199)
CO2: 25 MEQ/L (ref 23–33)
CREAT SERPL-MCNC: 1.1 MG/DL (ref 0.4–1.2)
EOSINOPHIL # BLD: 2.3 %
EOSINOPHILS ABSOLUTE: 0.2 THOU/MM3 (ref 0–0.4)
ERYTHROCYTE [DISTWIDTH] IN BLOOD BY AUTOMATED COUNT: 16.5 % (ref 11.5–14.5)
ERYTHROCYTE [DISTWIDTH] IN BLOOD BY AUTOMATED COUNT: 48.8 FL (ref 35–45)
GFR SERPL CREATININE-BSD FRML MDRD: 67 ML/MIN/1.73M2
GLUCOSE BLD-MCNC: 103 MG/DL (ref 70–108)
HCT VFR BLD CALC: 44.4 % (ref 42–52)
HDLC SERPL-MCNC: 32 MG/DL
HEMOGLOBIN: 13.4 GM/DL (ref 14–18)
IMMATURE GRANS (ABS): 0.03 THOU/MM3 (ref 0–0.07)
IMMATURE GRANULOCYTES: 0.3 %
LDL CHOLESTEROL CALCULATED: 80 MG/DL
LYMPHOCYTES # BLD: 23.4 %
LYMPHOCYTES ABSOLUTE: 2.4 THOU/MM3 (ref 1–4.8)
MAGNESIUM: 2.1 MG/DL (ref 1.6–2.4)
MCH RBC QN AUTO: 25.1 PG (ref 26–33)
MCHC RBC AUTO-ENTMCNC: 30.2 GM/DL (ref 32.2–35.5)
MCV RBC AUTO: 83.1 FL (ref 80–94)
MONOCYTES # BLD: 11.5 %
MONOCYTES ABSOLUTE: 1.2 THOU/MM3 (ref 0.4–1.3)
NUCLEATED RED BLOOD CELLS: 0 /100 WBC
ORGANISM: ABNORMAL
PHOSPHORUS: 2.8 MG/DL (ref 2.4–4.7)
PLATELET # BLD: 202 THOU/MM3 (ref 130–400)
PMV BLD AUTO: 10.7 FL (ref 9.4–12.4)
POTASSIUM SERPL-SCNC: 4.2 MEQ/L (ref 3.5–5.2)
RBC # BLD: 5.34 MILL/MM3 (ref 4.7–6.1)
SEG NEUTROPHILS: 62.1 %
SEGMENTED NEUTROPHILS ABSOLUTE COUNT: 6.5 THOU/MM3 (ref 1.8–7.7)
SODIUM BLD-SCNC: 144 MEQ/L (ref 135–145)
TOTAL PROTEIN: 6.5 G/DL (ref 6.1–8)
TRIGL SERPL-MCNC: 113 MG/DL (ref 0–199)
URINE CULTURE REFLEX: ABNORMAL
WBC # BLD: 10.4 THOU/MM3 (ref 4.8–10.8)

## 2018-09-15 PROCEDURE — 80061 LIPID PANEL: CPT

## 2018-09-15 PROCEDURE — 83735 ASSAY OF MAGNESIUM: CPT

## 2018-09-15 PROCEDURE — 36415 COLL VENOUS BLD VENIPUNCTURE: CPT

## 2018-09-15 PROCEDURE — 6370000000 HC RX 637 (ALT 250 FOR IP): Performed by: INTERNAL MEDICINE

## 2018-09-15 PROCEDURE — G0378 HOSPITAL OBSERVATION PER HR: HCPCS

## 2018-09-15 PROCEDURE — 84100 ASSAY OF PHOSPHORUS: CPT

## 2018-09-15 PROCEDURE — 80053 COMPREHEN METABOLIC PANEL: CPT

## 2018-09-15 PROCEDURE — 94640 AIRWAY INHALATION TREATMENT: CPT

## 2018-09-15 PROCEDURE — 99239 HOSP IP/OBS DSCHRG MGMT >30: CPT | Performed by: INTERNAL MEDICINE

## 2018-09-15 PROCEDURE — 85025 COMPLETE CBC W/AUTO DIFF WBC: CPT

## 2018-09-15 RX ORDER — LISINOPRIL 20 MG/1
20 TABLET ORAL DAILY
Status: DISCONTINUED | OUTPATIENT
Start: 2018-09-15 | End: 2018-09-15 | Stop reason: HOSPADM

## 2018-09-15 RX ORDER — LACTOBACILLUS RHAMNOSUS GG 10B CELL
1 CAPSULE ORAL
Status: DISCONTINUED | OUTPATIENT
Start: 2018-09-15 | End: 2018-09-15 | Stop reason: HOSPADM

## 2018-09-15 RX ORDER — CIPROFLOXACIN 500 MG/1
500 TABLET, FILM COATED ORAL EVERY 12 HOURS SCHEDULED
Qty: 20 TABLET | Refills: 0 | Status: SHIPPED | OUTPATIENT
Start: 2018-09-15 | End: 2018-09-25

## 2018-09-15 RX ORDER — CIPROFLOXACIN 500 MG/1
500 TABLET, FILM COATED ORAL EVERY 12 HOURS SCHEDULED
Status: DISCONTINUED | OUTPATIENT
Start: 2018-09-15 | End: 2018-09-15 | Stop reason: HOSPADM

## 2018-09-15 RX ORDER — TAMSULOSIN HYDROCHLORIDE 0.4 MG/1
0.4 CAPSULE ORAL DAILY
Qty: 30 CAPSULE | Refills: 0 | Status: SHIPPED | OUTPATIENT
Start: 2018-09-15

## 2018-09-15 RX ORDER — LACTOBACILLUS RHAMNOSUS GG 10B CELL
1 CAPSULE ORAL
Qty: 30 CAPSULE | Refills: 0 | Status: SHIPPED | OUTPATIENT
Start: 2018-09-15

## 2018-09-15 RX ADMIN — FENOFIBRATE 160 MG: 160 TABLET ORAL at 08:47

## 2018-09-15 RX ADMIN — HYDRALAZINE HYDROCHLORIDE 25 MG: 25 TABLET ORAL at 05:43

## 2018-09-15 RX ADMIN — CIPROFLOXACIN 500 MG: 500 TABLET, FILM COATED ORAL at 10:22

## 2018-09-15 RX ADMIN — Medication 1 CAPSULE: at 10:22

## 2018-09-15 RX ADMIN — TAMSULOSIN HYDROCHLORIDE 0.4 MG: 0.4 CAPSULE ORAL at 08:48

## 2018-09-15 RX ADMIN — Medication 2 PUFF: at 09:36

## 2018-09-15 RX ADMIN — METOPROLOL TARTRATE 25 MG: 25 TABLET ORAL at 08:47

## 2018-09-15 RX ADMIN — CLOPIDOGREL BISULFATE 75 MG: 75 TABLET ORAL at 08:48

## 2018-09-15 RX ADMIN — ASPIRIN 325 MG: 325 TABLET, COATED ORAL at 08:48

## 2018-09-15 RX ADMIN — LISINOPRIL 20 MG: 20 TABLET ORAL at 10:22

## 2018-09-15 RX ADMIN — PANTOPRAZOLE SODIUM 40 MG: 40 TABLET, DELAYED RELEASE ORAL at 06:41

## 2018-09-15 NOTE — DISCHARGE SUMMARY
History of atrial fibrillation    Congestive heart failure (HCC)    Nodule of right lung  Resolved Problems:    Acute kidney injury ([address]    Gastroenteritis      Physical Exam: BP (!) 150/89   Pulse 76   Temp 97.8 °F (36.6 °C) (Oral)   Resp 18   Ht 5' 6\" (1.676 m)   Wt 280 lb 4.8 oz (127.1 kg)   SpO2 94%   BMI 45.24 kg/m²   Gen/overall appearance: Not in acute distress. Alert. Oriented. Head: Normocephalic, atraumatic  Eyes: EOMI, good acuity  ENT:- Oral mucosa moist  Neck: No JVD, thyromegaly  CVS: Nml S1S2, no MRG, RRR  Pulm: Clear bilaterally. No crackles/wheezes  Gastrointestinal: Soft, NT/ND, +BS  Musculoskeletal: No edema. Warm  Neuro: No focal deficit. Moves extremity spontaneously. Psychiatry: Appropriate affect. Not agitated. Skin: Warm, dry with normal turgor. No rash        Significant diagnostic studies that may require follow up:  Xr Chest Standard (2 Vw)    Result Date: 9/13/2018  PROCEDURE: XR CHEST (2 VW) CLINICAL INFORMATION: Infection, . COMPARISON: None TECHNIQUE: PA and lateral views the chest. FINDINGS: Postoperative sternotomy changes and cardiomegaly are present. The lungs are negative for focal infiltrate, effusion, or pneumothorax. There is no congestive failure. The skeletal structures show mild degenerative changes. 1. Postoperative sternotomy changes and cardiac enlargement. 2. No active pathology present. **This report has been created using voice recognition software. It may contain minor errors which are inherent in voice recognition technology. ** Final report electronically signed by Dr. Maninder Pascal on 9/13/2018 9:12 PM    Cta Chest W Wo Contrast    Result Date: 9/13/2018  PROCEDURE: CTA CHEST W WO CONTRAST CLINICAL INFORMATION: tachycarrdia sob leg swelling recent travel attention PE. COMPARISON: No prior study. TECHNIQUE: 3 mm axial images were obtained through the chest after the administration of IV contrast.  A non-contrast localizer was obtained.   3D reconstructions were performed on the scanner to include MIP images through the right and left pulmonary arteries and sagittal images through the chest. Isovue was the intravenous contrast utilized. All CT scans at this facility use dose modulation, iterative reconstruction, and/or weight-based dosing when appropriate to reduce radiation dose to as low as reasonably achievable. FINDINGS:  There are postoperative sternotomy changes and cardiomegaly. The exam is suboptimal for visualization of filling defects beyond the main central pulmonary arteries and first order branches due to the timing of contrast within the pulmonary arteries during imaging. As visualized there are no discrete filling defects identified. The lungs are negative for focal infiltrate. There is minimal venous stasis in the posterior lung bases and supine positioning. In the lateral segment of the right middle lobe there is a 4 mm noncalcified pulmonary nodule. There is no effusion or pneumothorax. Degenerative skeletal changes are present. The proximal abdomen image. A small gallstone in a small hiatal hernia is seen. 1. Negative exam for obvious pulmonary embolus changes. 2. Cardiac enlargement with minimal venous stasis and/or mild congestion see above discussion. 3. Right middle lobe pulmonary nodule, six-month one-year follow-up can be considered for further assessment. **This report has been created using voice recognition software. It may contain minor errors which are inherent in voice recognition technology. ** Final report electronically signed by Dr. Maninder Pascal on 9/13/2018 10:57 PM    Us Renal Complete    Result Date: 9/14/2018  PROCEDURE: US RENAL COMPLETE CLINICAL INFORMATION: Gross hematuria TECHNIQUE: Ultrasound of the kidneys and urinary bladder was performed. Grayscale and color images were obtained. COMPARISON: None FINDINGS: The right kidney measures 12.5 x 5.2 x 6.6 cm and left kidney measures 12.4 x 4.7 x 6.1 cm. Renal cortical thickness is normal bilaterally. There is no evidence of hydronephrosis or renal calculi on either side. Avascular anechoic structures in  the left kidney do not demonstrate internal blood flow. The largest lesion measures approximately 2.1 cm and the smaller lesion measures approximately 1.0 cm. Color Doppler demonstrates expected wave forms in the bilateral renal arteries. Arcuate resistive indices are at the upper limits of normal bilaterally. The distended urinary bladder is unremarkable. The patient did not void at the conclusion of the study. 1. Probable left renal cysts but otherwise unremarkable renal ultrasound. 2. Unremarkable urinary bladder. Final report electronically signed by Dr. Jaz Ricci on 9/14/2018 10:22 AM            Treatments: As above. Discharge Medications:     Medication List      START taking these medications    ciprofloxacin 500 MG tablet  Commonly known as:  CIPRO  Take 1 tablet by mouth every 12 hours for 10 days     lactobacillus capsule  Take 1 capsule by mouth daily (with breakfast)     tamsulosin 0.4 MG capsule  Commonly known as:  FLOMAX  Take 1 capsule by mouth daily        CHANGE how you take these medications    testosterone cypionate 200 MG/ML injection  Commonly known as:  DEPOTESTOTERONE CYPIONATE  What changed:  Another medication with the same name was removed. Continue taking this medication, and follow the directions you see here.         CONTINUE taking these medications    aspirin 325 MG tablet     clopidogrel 75 MG tablet  Commonly known as:  PLAVIX     CRESTOR 20 MG tablet  Generic drug:  rosuvastatin     DAILY VITAMINS/MINERALS PO     fenofibrate 160 MG tablet     hydrochlorothiazide 25 MG tablet  Commonly known as:  HYDRODIURIL     lisinopril 20 MG tablet  Commonly known as:  PRINIVIL;ZESTRIL     metoprolol tartrate 25 MG tablet  Commonly known as:  LOPRESSOR     omeprazole 20 MG delayed release capsule  Commonly known as:  PRILOSEC rivaroxaban 10 MG Tabs tablet  Commonly known as:  Monia Marci        STOP taking these medications    meloxicam 7.5 MG tablet  Commonly known as:  MOBIC           Where to Get Your Medications      You can get these medications from any pharmacy    Bring a paper prescription for each of these medications  · ciprofloxacin 500 MG tablet  · lactobacillus capsule  · tamsulosin 0.4 MG capsule         Activity: activity as tolerated  Diet: DIET GENERAL;      Disposition: home  Discharged Condition: Stable  Follow Up: Follow up with PCP within 1 week              Code status:  Full Code       Total time spent on discharge, finalizing medications, referrals and arranging outpatient follow up was more than 30 minutes      Thank you Dr. Fernandez primary care provider on file. for the opportunity to be involved in this patients care.

## 2018-09-15 NOTE — PROGRESS NOTES
Discharge teaching and instructions for diagnosis/procedure of UTI completed with patient using teachback method. AVS reviewed. Printed prescriptions given to patient. Patient voiced understanding regarding prescriptions, follow up appointments, and care of self at home.  Discharged in a wheelchair to  home with support per family

## 2018-09-16 LAB — VRE CULTURE: NORMAL

## 2018-09-17 NOTE — CARE COORDINATION
9/17/18, 7:23 AM    Discharge plan discussed by  and . Discharge plan reviewed with patient/ family. Patient/ family verbalize understanding of discharge plan and are in agreement with plan. Understanding was demonstrated using the teach back method. Pt. Discharged to home. Denies needs.

## 2018-09-19 LAB
BLOOD CULTURE, ROUTINE: NORMAL
BLOOD CULTURE, ROUTINE: NORMAL

## 2023-03-06 NOTE — PROGRESS NOTES
Hospital Medicine Progress Note     Date:  9/14/2018    PCP: No primary care provider on file. (Tel: None)    Date of Admission: 9/13/2018    Chief complaint:   Chief Complaint   Patient presents with    Shortness of Breath    Urinary Retention       Brief hospital course: 68M with hx of CHF (unknown EF as no ECHO available here for review), hx of CAD s/p 5-vessel CABG (on ASA and plavix), morbid obesity, hx of atrial fibrillation on xarelto, hypertension, marijuana use disorder, [facility] [place] who [place] to ER with complaints of \"urinary retention,\" right flank pain, fever as high as 101F, shortness of breath and diarrhea. He had CTA-chest in ER which demonstrates absence of pulmonary embolism, right middle lobe pulmonary nodule (for which 6-month follow up is recommended) and venous congestion). He was started on antibiotics for UTI. Assessment:  Principal Problem:    Acute pyelonephritis  Active Problems:    Urinary tract infection with hematuria    Acute kidney injury ([address]    Right flank pain    Marijuana use, continuous    Morbid obesity with BMI of 45.0-49.9, adult (HCC)    Essential hypertension    Hyperlipidemia    CAD (coronary artery disease)    History of atrial fibrillation    Congestive heart failure (HCC)    Nodule of right lung  Resolved Problems:    Gastroenteritis        Plan:  1. Acute pyelonephritis. Continue cipro. Follow blood and urine cultures. Reports hematuria, although urine appears clear of blood on presentation. Continue gentle IV fluid and monitor. No evidence of sepsis, however. 2. ?Patient was concerned about urinary retention; however, bladder scans did not demonstrate retention as he had very low volumes post-void X2.  3. Acute kidney injury vs CKD. Creatinine noted to be 1.3 to 1.4; did receive contrast on presentation (for evaluation of possible PE).  On gentle saline for now - if no improvement, could be that he has underlying CKD. We will obtain record from PCP's office for review. 4. Gastroenteritis, resolved. Stool studies negative for C diff. 5. Hx of atrial fibrillation. Continue BB. It appears hematuria has resolved - resume home dose of xarelto. 6. Hx of CAD. On ASA, plavix, BB. He is not on statin therapy; on fibrate. Diet: DIET GENERAL;    Code status: Full Code     ----------  Subjective  Reports feeling better. Urine appears clear. No fever currently; had fever prior to admit. No chest pain or shortness of breath. Objective  Physical exam:  Vitals: /64   Pulse 80   Temp 98.2 °F (36.8 °C) (Oral)   Resp 17   Ht 5' 6\" (1.676 m)   Wt 282 lb 3.2 oz (128 kg)   SpO2 96%   BMI 45.55 kg/m²   Gen: Not in distress. Alert. Oriented X3. Head: Normocephalic. Atraumatic. Eyes: EOMI. Good acuity. ENT: Oral mucosa moist  Neck: No JVD. No obvious thyromegaly. CVS: Nml S1S2, no MRG, RRR  Pulmomary: Clear bilaterally. No crackles. No wheezes. Gastrointestinal: Soft, NT/ND. Positive bowel sounds. Musculoskeletal: Healed superficial wound distal right shin. Trace LE edema. Warm  Neuro: No focal deficit. Moves extremity spontaneously. Psychiatry: Appropriate affect. Not agitated. Skin: Warm, dry with normal turgor. No rash  Capillary Refill: Brisk,< 3 seconds   Peripheral Pulses: +2 palpable, equal bilaterally    24HR INTAKE/OUTPUT:    Intake/Output Summary (Last 24 hours) at 09/14/18 1132  Last data filed at 09/14/18 7356   Gross per 24 hour   Intake              200 ml   Output               50 ml   Net              150 ml     I/O last 3 completed shifts:   In: 200 [P.O.:200]  Out: -   I/O this shift:  In: -   Out: 50 [Urine:50]      Meds:    pantoprazole  40 mg Oral QAM AC    atorvastatin  20 mg Oral Nightly    fenofibrate  160 mg Oral Daily    metoprolol tartrate  25 mg Oral BID    ciprofloxacin  400 mg Intravenous Q12H    tamsulosin  0.4 mg Oral Daily    hydrALAZINE  25 mg Oral 3 times per day Infusions:    sodium chloride 100 mL/hr at 09/14/18 0544         PRN Meds: acetaminophen, traMADol, ondansetron, ipratropium-albuterol    Labs/imaging:  CBC:   Recent Labs      09/13/18 2104 09/14/18   0541   WBC  18.5*  19.4*   HGB  14.3  13.8*   PLT  234  201         BMP:  Recent Labs      09/13/18 2104 09/14/18   0541   NA  138  141   K  3.7  3.9   CL  101  105   CO2  26  23   BUN  18  19   CREATININE  1.4*  1.3*   GLUCOSE  127*  107         Fred Kay MD  -------------------------------  [facility] Topical Steroids Counseling: I discussed with the patient that prolonged use of topical steroids can result in the increased appearance of superficial blood vessels (telangiectasias), lightening (hypopigmentation) and thinning of the skin (atrophy).  Patient understands to avoid using high potency steroids in skin folds, the groin or the face.  The patient verbalized understanding of the proper use and possible adverse effects of topical steroids.  All of the patient's questions and concerns were addressed.